# Patient Record
Sex: MALE | Race: OTHER | Employment: FULL TIME | URBAN - METROPOLITAN AREA
[De-identification: names, ages, dates, MRNs, and addresses within clinical notes are randomized per-mention and may not be internally consistent; named-entity substitution may affect disease eponyms.]

---

## 2018-02-19 ENCOUNTER — APPOINTMENT (OUTPATIENT)
Dept: GENERAL RADIOLOGY | Age: 63
End: 2018-02-19
Attending: INTERNAL MEDICINE
Payer: COMMERCIAL

## 2018-02-19 ENCOUNTER — APPOINTMENT (OUTPATIENT)
Dept: CT IMAGING | Age: 63
End: 2018-02-19
Attending: INTERNAL MEDICINE
Payer: COMMERCIAL

## 2018-02-19 ENCOUNTER — HOSPITAL ENCOUNTER (EMERGENCY)
Age: 63
Discharge: HOME | End: 2018-02-19
Attending: INTERNAL MEDICINE
Payer: COMMERCIAL

## 2018-02-19 VITALS
TEMPERATURE: 98 F | SYSTOLIC BLOOD PRESSURE: 138 MMHG | HEART RATE: 70 BPM | WEIGHT: 170 LBS | DIASTOLIC BLOOD PRESSURE: 70 MMHG | OXYGEN SATURATION: 100 % | RESPIRATION RATE: 16 BRPM

## 2018-02-19 DIAGNOSIS — E87.6 HYPOKALEMIA: ICD-10-CM

## 2018-02-19 DIAGNOSIS — R42 DIZZINESS: Primary | ICD-10-CM

## 2018-02-19 DIAGNOSIS — E83.51 HYPOCALCEMIA: ICD-10-CM

## 2018-02-19 LAB
ALBUMIN SERPL-MCNC: 3 G/DL (ref 3.4–5)
ALBUMIN/GLOB SERPL: 1 {RATIO} (ref 0.8–1.7)
ALP SERPL-CCNC: 62 U/L (ref 45–117)
ALT SERPL-CCNC: 22 U/L (ref 16–61)
ANION GAP SERPL CALC-SCNC: 7 MMOL/L (ref 3–18)
APPEARANCE UR: CLEAR
AST SERPL-CCNC: 17 U/L (ref 15–37)
ATRIAL RATE: 66 BPM
BASOPHILS # BLD: 0 K/UL (ref 0–0.06)
BASOPHILS NFR BLD: 0 % (ref 0–2)
BILIRUB SERPL-MCNC: 0.7 MG/DL (ref 0.2–1)
BILIRUB UR QL: NEGATIVE
BUN SERPL-MCNC: 15 MG/DL (ref 7–18)
BUN/CREAT SERPL: 15 (ref 12–20)
CALCIUM SERPL-MCNC: 8 MG/DL (ref 8.5–10.1)
CALCULATED P AXIS, ECG09: 3 DEGREES
CALCULATED R AXIS, ECG10: 56 DEGREES
CALCULATED T AXIS, ECG11: 67 DEGREES
CHLORIDE SERPL-SCNC: 108 MMOL/L (ref 100–108)
CK MB CFR SERPL CALC: 2 % (ref 0–4)
CK MB CFR SERPL CALC: NORMAL % (ref 0–4)
CK MB SERPL-MCNC: 1.5 NG/ML (ref 5–25)
CK MB SERPL-MCNC: <1 NG/ML (ref 5–25)
CK SERPL-CCNC: 60 U/L (ref 39–308)
CK SERPL-CCNC: 74 U/L (ref 39–308)
CO2 SERPL-SCNC: 27 MMOL/L (ref 21–32)
COLOR UR: YELLOW
CREAT SERPL-MCNC: 1.02 MG/DL (ref 0.6–1.3)
DIAGNOSIS, 93000: NORMAL
DIFFERENTIAL METHOD BLD: ABNORMAL
EOSINOPHIL # BLD: 0.2 K/UL (ref 0–0.4)
EOSINOPHIL NFR BLD: 3 % (ref 0–5)
ERYTHROCYTE [DISTWIDTH] IN BLOOD BY AUTOMATED COUNT: 13.5 % (ref 11.6–14.5)
GLOBULIN SER CALC-MCNC: 3 G/DL (ref 2–4)
GLUCOSE SERPL-MCNC: 99 MG/DL (ref 74–99)
GLUCOSE UR STRIP.AUTO-MCNC: NEGATIVE MG/DL
HCT VFR BLD AUTO: 40.2 % (ref 36–48)
HGB BLD-MCNC: 13.6 G/DL (ref 13–16)
HGB UR QL STRIP: NEGATIVE
KETONES UR QL STRIP.AUTO: NEGATIVE MG/DL
LEUKOCYTE ESTERASE UR QL STRIP.AUTO: NEGATIVE
LYMPHOCYTES # BLD: 2.2 K/UL (ref 0.9–3.6)
LYMPHOCYTES NFR BLD: 28 % (ref 21–52)
MAGNESIUM SERPL-MCNC: 1.8 MG/DL (ref 1.6–2.6)
MCH RBC QN AUTO: 28.9 PG (ref 24–34)
MCHC RBC AUTO-ENTMCNC: 33.8 G/DL (ref 31–37)
MCV RBC AUTO: 85.5 FL (ref 74–97)
MONOCYTES # BLD: 0.9 K/UL (ref 0.05–1.2)
MONOCYTES NFR BLD: 11 % (ref 3–10)
NEUTS SEG # BLD: 4.5 K/UL (ref 1.8–8)
NEUTS SEG NFR BLD: 58 % (ref 40–73)
NITRITE UR QL STRIP.AUTO: NEGATIVE
P-R INTERVAL, ECG05: 142 MS
PH UR STRIP: 7.5 [PH] (ref 5–8)
PLATELET # BLD AUTO: 188 K/UL (ref 135–420)
PMV BLD AUTO: 10.2 FL (ref 9.2–11.8)
POTASSIUM SERPL-SCNC: 3.3 MMOL/L (ref 3.5–5.5)
PROT SERPL-MCNC: 6 G/DL (ref 6.4–8.2)
PROT UR STRIP-MCNC: NEGATIVE MG/DL
Q-T INTERVAL, ECG07: 420 MS
QRS DURATION, ECG06: 92 MS
QTC CALCULATION (BEZET), ECG08: 440 MS
RBC # BLD AUTO: 4.7 M/UL (ref 4.7–5.5)
SODIUM SERPL-SCNC: 142 MMOL/L (ref 136–145)
SP GR UR REFRACTOMETRY: 1.01 (ref 1–1.03)
TROPONIN I SERPL-MCNC: <0.02 NG/ML (ref 0–0.06)
TROPONIN I SERPL-MCNC: <0.02 NG/ML (ref 0–0.06)
UROBILINOGEN UR QL STRIP.AUTO: 0.2 EU/DL (ref 0.2–1)
VENTRICULAR RATE, ECG03: 66 BPM
WBC # BLD AUTO: 7.9 K/UL (ref 4.6–13.2)

## 2018-02-19 PROCEDURE — 81003 URINALYSIS AUTO W/O SCOPE: CPT | Performed by: INTERNAL MEDICINE

## 2018-02-19 PROCEDURE — 74011250636 HC RX REV CODE- 250/636: Performed by: INTERNAL MEDICINE

## 2018-02-19 PROCEDURE — 83735 ASSAY OF MAGNESIUM: CPT | Performed by: INTERNAL MEDICINE

## 2018-02-19 PROCEDURE — 80053 COMPREHEN METABOLIC PANEL: CPT | Performed by: INTERNAL MEDICINE

## 2018-02-19 PROCEDURE — 85025 COMPLETE CBC W/AUTO DIFF WBC: CPT | Performed by: INTERNAL MEDICINE

## 2018-02-19 PROCEDURE — 96360 HYDRATION IV INFUSION INIT: CPT

## 2018-02-19 PROCEDURE — 71045 X-RAY EXAM CHEST 1 VIEW: CPT

## 2018-02-19 PROCEDURE — 99285 EMERGENCY DEPT VISIT HI MDM: CPT

## 2018-02-19 PROCEDURE — 93005 ELECTROCARDIOGRAM TRACING: CPT

## 2018-02-19 PROCEDURE — 70450 CT HEAD/BRAIN W/O DYE: CPT

## 2018-02-19 PROCEDURE — 96361 HYDRATE IV INFUSION ADD-ON: CPT

## 2018-02-19 PROCEDURE — 74011250637 HC RX REV CODE- 250/637: Performed by: INTERNAL MEDICINE

## 2018-02-19 PROCEDURE — 82553 CREATINE MB FRACTION: CPT | Performed by: INTERNAL MEDICINE

## 2018-02-19 RX ORDER — CALCIUM CARBONATE 200(500)MG
500 TABLET,CHEWABLE ORAL
Status: COMPLETED | OUTPATIENT
Start: 2018-02-19 | End: 2018-02-19

## 2018-02-19 RX ORDER — POTASSIUM CHLORIDE 1.5 G/1.77G
40 POWDER, FOR SOLUTION ORAL
Status: COMPLETED | OUTPATIENT
Start: 2018-02-19 | End: 2018-02-19

## 2018-02-19 RX ADMIN — ANTACID TABLETS 500 MG: 500 TABLET, CHEWABLE ORAL at 09:32

## 2018-02-19 RX ADMIN — SODIUM CHLORIDE 1000 ML: 900 INJECTION, SOLUTION INTRAVENOUS at 08:16

## 2018-02-19 RX ADMIN — POTASSIUM CHLORIDE 40 MEQ: 1.5 POWDER, FOR SOLUTION ORAL at 09:32

## 2018-02-19 NOTE — ED TRIAGE NOTES
Pt brought in by ems for dizziness. Pt states \" I was in the shower taking a hot bth and I didn't urn the vent on and I think I overheated. \" I feel much better now. \"

## 2018-02-19 NOTE — ED NOTES
Pt tolerated orthostatic VS without difficulty, denies dizziness. Family at bedside. Pt in NAD, will continue to monitor. Call light within reach. No needs expressed at this time. Urinal within reach.

## 2018-02-19 NOTE — DISCHARGE INSTRUCTIONS
Hipocalcemia: Instrucciones de cuidado - [ Hypocalcemia: Care Instructions ]  Instrucciones de cuidado    La hipocalcemia consiste en tener el nivel de calcio en la delfino más bajo de lo que debería. Godwin médico girma vez le haya hecho pruebas para revisar lauren niveles de calcio porque usted tenía ciertos síntomas. Estos incluyen hormigueo o espasmos en los músculos. Godwin médico puede hacerle más pruebas para averiguar por qué está bajo godwin calcio y para victorino lo xiang que le están funcionando los riñones y Carla Roya. Godwin médico también querrá victorino lo xiang que le está funcionando la glándula paratiroidea. Esta glándula controla los niveles de calcio en la Indu. Usted puede tener truong problema porque no está obteniendo suficientemente calcio en godwin alimentación. O es posible que godwin cuerpo no esté absorbiendo el calcio arnav debería. Usted puede hacer que godwin calcio llegue a un nivel seguro tomando suplementos. Si lauren niveles son [place] oak, godwin médico podría inyectarle calcio, girma vez junto con magnesio. Probablemente también le den vitamina D, porque la necesita para absorber el calcio. Después de que godwin médico logre elevarle el nivel de calcio, asegúrese de obtener abundante calcio en godwin alimentación. Si tiene un problema renal o paratiroideo, es posible que tenga que seguir tomando calcio adicional.  La atención de seguimiento es ligia parte clave de godwin tratamiento y seguridad. Asegúrese de hacer y acudir a todas las citas, y llame a godwin médico si está teniendo problemas. También es ligia buena idea saber los resultados de los exámenes y mantener ligia lista de los medicamentos que amairani. ¿Cómo puede cuidarse en el hogar? · [place] los medicamentos exactamente según las indicaciones. Llame a godwin médico si deisy estar teniendo un problema con godwin medicamento. · Consuma alimentos ricos en calcio. Estos incluyen yogur, Zaid Lent y verduras de hojas de color myla oscuro.  Esta es la mejor manera de obtener la cantidad de calcio que necesita. También puede obtener la vitamina D de los [place], los pescados grasos, los cereales y [place]. · Hable con godwin médico sobre yudi un suplemento de calcio más vitamina D.  · Manténgase activo. Hacer regularmente ejercicios en los que se soporta el propio peso corporal, arnav caminar, puede ayudarle a [employer] ricarda. También puede mejorar godwin estado general de josé antonio. · Póngase al sol sin protector solar por ligia pequeña cantidad de tiempo. El sol ayuda al cuerpo a elaborar la vitamina D. Hable mariano con godwin médico si ha tenido cáncer de piel o si tiene alto riesgo de tener cáncer de piel. ¿Cuándo debe pedir ayuda? Llame a godwin médico ahora mismo o busque atención médica inmediata si:  ? · Siente hormigueo o entumecimiento en los dedos y las rebekah o los dedos del pie y los pies. ? · Está confuso o tiene problemas para recordar cosas. ? · Tiene espasmos o calambres musculares. ? · El corazón parece acelerarse y luego desacelerarse o se salta latidos. ? · Se siente melissa o deprimido, o no disfruta de las cosas arnav solía [facility] [facility]. Podría estar deprimido, lo que es común en personas con hipocalcemia. La depresión se puede tratar. ?Preste especial atención a los cambios en godwin josé antonio y asegúrese de comunicarse con godwin médico si:  ? · No mejora arnav se esperaba. ¿Dónde puede encontrar más información en inglés? Wen Taylor a [email or web address] Escriba P520 en la búsqueda para aprender más acerca de \"Hipocalcemia: Instrucciones de cuidado - [ Hypocalcemia: Care Instructions ]. \"  Revisado: 12 vyas, 2017  Versión del contenido: 11.4  © 4686-0743 Healthwise, [employer]. Las instrucciones de cuidado fueron adaptadas bajo licencia por [employer] (which disclaims liability or warranty for this information). Si usted tiene [place] afección médica o sobre estas instrucciones, siempre pregunte a godwin profesional de josé antonio.  [employer] niega toda garantía o responsabilidad por godwin uso de esta información. Mareos: Instrucciones de cuidado - [ Dizziness: Care Instructions ]  Instrucciones de cuidado  Los mareos son [place] sensación de inestabilidad o confusión en la gunner. Son distintos al vértigo, ligia sensación de que la habitación gira o de que usted se mueve o . También es distinto del aturdimiento, que es la sensación de que está a punto de desmayarse. Puede resultar difícil conocer la causa de los [place]. Algunas personas se sienten mareadas cuando tienen migrañas. A veces, los episodios gripales pueden hacer que se sienta mareado. Algunas afecciones médicas, arnav los problemas cardíacos o la presión arterial zena, pueden hacer que se sienta mareado. Muchos medicamentos pueden causar mareos, arnav los que se usan para la presión arterial zena, el dolor o la ansiedad. Si es un medicamento el que está causando los síntomas, godwin médico podría recomendarle que lo cambie o deje de tomarlo. Si es un problema cardíaco, podría necesitar medicamentos para ayudar a que godwin corazón funcione mejor. Si no hay razón aparente para los síntomas, godwin médico podría sugerir vigilar y esperar sidney un tiempo para victorino si los mareos desaparecen por sí solos. La atención de seguimiento es ligia parte clave de godwin tratamiento y seguridad. Asegúrese de hacer y acudir a todas las citas, y llame a godwin médico si está teniendo problemas. También es ligia buena idea saber los resultados de los exámenes y mantener ligia lista de los medicamentos que amairani. ¿Cómo puede cuidarse en el hogar? · Si godwin médico le recomienda o receta medicamentos, tómelos exactamente según las indicaciones. Llame a godwin médico si deisy estar teniendo un problema con godwin medicamento. · No conduzca mientras se sienta mareado. · Trate de prevenir las caídas. Algunas medidas que puede yudi son:  Buster Shed Usar tapetes antideslizantes, agregar agarraderas cerca de la minna y usar luces nocturnas.   ¨ Ordenar godwin casa de girma manera que en los senderos no haya nada con lo que se pueda tropezar. ¨ Avisarles a familiares y [facility] que se ha estado sintiendo [place]. [place] les servirá para saber cómo ayudarle. ¿Cuándo debe pedir ayuda? Llame al 911 en cualquier momento que considere que necesita atención de [place]. Por ejemplo, llame si:  ? · Se desmayó (perdió el conocimiento). ? · Tiene mareos junto con síntomas de un ataque cardíaco. Estos pueden incluir:  ¨ Dolor de pecho, o presión o ligia sensación extraña en el pecho. ¨ Sudoración. ¨ Falta de aire. ¨ Náuseas o vómito. ¨ Dolor, presión, o ligia sensación extraña en la espalda, el john, la mandíbula o el abdomen superior, o en lacey o ambos hombros o brazos. ¨ Aturdimiento o debilidad repentina. ¨ Un latido cardíaco rápido o irregular. ? · Tiene síntomas de un ataque cerebral. Estos pueden incluir:  ¨ Entumecimiento, hormigueo, debilidad o parálisis repentinos en la nayeli, el brazo o la pierna, sobre todo si ocurre en un solo lado del cuerpo. ¨ Cambios súbitos en la vista. ¨ Problemas repentinos para hablar. ¨ Confusión súbita o dificultad repentina para comprender frases sencillas. ¨ Problemas repentinos para caminar o mantener el equilibrio. ¨ Un dolor de gunner intenso y repentino, distinto a los jose de gunner anteriores. ?Llame a godwin médico ahora mismo o busque atención médica inmediata si:  ? · Se siente mareado y tiene [place], bhargavi [place] o zumbido [employer] oídos. ? · Tiene nuevas náuseas y vómito o [address] ? · Lyric mareos no desaparecen o regresan. ?Preste especial atención a los cambios en godwin josé antonio y asegúrese de comunicarse con godwin médico si:  ? · No mejora arnav se esperaba. ¿Dónde puede encontrar más información en inglés? Lenora Pulido a [email or web address] [place] V673 en la búsqueda para aprender más acerca de \"Mareos: Instrucciones de cuidado - [ Dizziness: Care Instructions ]. \"  Revisado: 20 marzo, 2017  Versión del contenido: 11.4  © 8266-2586 Healthwise, [employer]. Las instrucciones de cuidado fueron adaptadas bajo licencia por [employer] (which disclaims liability or warranty for this information). Si usted tiene [place] afección médica o sobre estas instrucciones, siempre pregunte a godwin profesional de josé antonio. Healthwise, Incorporated niega toda garantía o responsabilidad por godwin uso de esta información. Hipocalemia: Instrucciones de cuidado - [ Hypokalemia: Care Instructions ]  Instrucciones de cuidado    La hipocalemia es un nivel bajo de potasio. El corazón, los músculos, los riñones y el sistema nervioso necesitan potasio para funcionar correctamente. Maria Luisa problema tiene muchas [employer]. Los problemas renales, la alimentación y los medicamentos arnav diuréticos y laxantes pueden causarla. [address] En algunos casos, el cáncer es la causa. Godwin médico puede hacerle pruebas para determinar la causa de lauren niveles bajos de [place]. Es posible que necesite medicamentos para hacer que lauren niveles de potasio vuelvan a la normalidad. También podría tener que hacerse análisis de delfino con regularidad para revisar el potasio. Si tiene un nivel de potasio muy bajo, podría necesitar medicamentos por vía intravenosa (IV). También podría necesitar pruebas para revisar la actividad eléctrica de godwin corazón. Los problemas del corazón causados por los bajos niveles de potasio pueden ser [employer]. La atención de seguimiento es ligia parte clave de godwin tratamiento y seguridad. Asegúrese de hacer y acudir a todas las citas, y llame a godwin médico si está teniendo problemas. También es ligia buena idea saber los resultados de los exámenes y mantener ligia lista de los medicamentos que amairani. ¿Cómo puede cuidarse en el hogar? · Si el médico lo recomienda, consuma alimentos que tengan [employer]. Estos incluyen frutas frescas, jugos y verduras.  Patti Pimentel [address] [place] [place], los frijoles (habichuelas) y [address] · Sea malcolm con los medicamentos. Si godwin médico le receta medicamentos o suplementos de potasio, tómelos según las indicaciones. Llame a godwin médico si tiene algún problema con los medicamentos. · Hágase análisis para revisar lauren niveles de potasio con la frecuencia que le indique el médico.  ¿Cuándo debe pedir ayuda? Llame al 911 en cualquier momento que considere que necesita atención de [place]. Por ejemplo, llame si:  ? · Siente que godwin corazón omite latidos. Los problemas del corazón causados por los bajos niveles de potasio pueden provocar la [place]. ? · Se desmayó (perdió el conocimiento). ? · Tiene un episodio de convulsiones. ?Llame a godwin médico ahora mismo o busque atención médica inmediata si:  ? · Se siente débil o inusualmente cansado. ? · Tiene ricarda calambres en los brazos o las piernas. ? · Tiene hormigueo o entumecimiento. ? · Siente el estómago revuelto, o vomita. ? · Tiene retortijones abdominales. ? · Está abotagado o estreñido. ? · Necesita orinar con mucha frecuencia. ? · Tiene mucha sed la mayor parte del [place]. ? · [employer] o aturdimiento, o que está a punto de [place]. ? · Se siente deprimido o pierde el contacto con la realidad. ?Preste especial atención a los cambios en godwin josé antonio y asegúrese de comunicarse con godwin médico si:  ? · No mejora arnav se esperaba. ¿Dónde puede encontrar más información en [facility]? Xavier Pope a [email or web address].[email or web address] [facility] I087 en la búsqueda para aprender más acerca de \"Hipocalemia: Instrucciones de cuidado - [ Hypokalemia: Care Instructions ]. \"  Revisado: 12 mayo, 2017  Versión del contenido: 11.4  © 2385-2458 Healthwise, [employer]. Las instrucciones de cuidado fueron adaptadas bajo licencia por [employer] (which disclaims liability or warranty for this information).  Si usted tiene [place] afección médica o sobre estas instrucciones, siempre pregunte a godwin profesional de josé antonio. [facility] [employer] niega toda garantía o responsabilidad por godwin uso de esta información.

## 2018-02-19 NOTE — ED PROVIDER NOTES
EMERGENCY DEPARTMENT HISTORY AND PHYSICAL EXAM    Date: 2/19/2018  Patient Name: Ethel Diaz    History of Presenting Illness     Chief Complaint   Patient presents with    Dizziness         History Provided By: Patient    Chief Complaint: dizziness  Duration: PTA   Timing:  Acute  Location: generalized  Severity: 0 out of 10  Associated Symptoms: syncope    Additional History (Context):   7:22 AM  Ethel Diaz is a 57 y.o. male with PMHX of HTN (compliant with medication) who presents to the emergency department via EMS C/O acute dizziness where pt had a momentary syncopal episode while taking a hot shower onset PTA. Pt attributes this to water being too hot and being hydrated and states his blood pressure went down, as pt is improved now. Pt states he felt fine before the episode occurred. Pt occasionally takes ASA 81 mg. Pt is a former smoker and a social EtOH user. PSHx includes bowel obstruction surgery. NKDA. Pt denies nausea, vomiting, chest pain, SOB, bleeding, swelling, rash, fever, head injury, dizziness that occurred after syncopal episode, abd pain, and any other sxs or complaints. PCP: Carlton Valadez MD    Current Outpatient Prescriptions   Medication Sig Dispense Refill    AMLODIPINE BESYLATE (AMLODIPINE PO) Take  by mouth. Past History     Past Medical History:  Past Medical History:   Diagnosis Date    Bowel obstruction     Hypertension        Past Surgical History:  Past Surgical History:   Procedure Laterality Date    HX GI      bowel obstruction       Family History:  History reviewed. No pertinent family history. Social History:  Social History   Substance Use Topics    Smoking status: Former Smoker    Smokeless tobacco: Never Used    Alcohol use Yes      Comment: socially       Allergies: Allergies no known allergies      Review of Systems   Review of Systems   Constitutional: Negative for fever.    HENT:        (-) Head injury   Respiratory: Negative for shortness of breath. Cardiovascular: Negative for chest pain. Gastrointestinal: Negative for abdominal pain, nausea and vomiting. Skin: Negative for rash. (-) Bleeding   Neurological: Positive for dizziness and syncope. All other systems reviewed and are negative. Physical Exam     Vitals:    02/19/18 0704 02/19/18 0730 02/19/18 0800 02/19/18 0900   BP: 130/70  119/70 141/72   Pulse: 72 79 68 75   Resp: 15 17 15 16   Temp: 97.6 °F (36.4 °C)      SpO2: 97% 97% 97% 100%   Weight: 77.1 kg (170 lb)        Physical Exam   Constitutional: He is oriented to person, place, and time. He appears well-developed and well-nourished. HENT:   Head: Normocephalic and atraumatic. Right Ear: External ear normal.   Left Ear: External ear normal.   Nose: Nose normal.   Mouth/Throat: Mucous membranes are dry. He has dentures. Posterior oropharyngeal erythema present. Erythema in posterior oropharynx. Posterior cervical lymphadenopathy. Mouth has erythema in bilateral middle turbinates. No sinus TTP. Eyes: Conjunctivae and EOM are normal. Pupils are equal, round, and reactive to light. Neck: Normal range of motion. Neck supple. No JVD present. No tracheal deviation present. No thyromegaly present. Cardiovascular: Normal rate, regular rhythm, normal heart sounds and intact distal pulses. Pulmonary/Chest: Effort normal and breath sounds normal.   Abdominal: Soft. Bowel sounds are normal. He exhibits no distension and no mass. There is no tenderness. No HSM   Musculoskeletal: Normal range of motion. He exhibits no edema or tenderness. Lymphadenopathy:     He has no cervical adenopathy. Neurological: He is alert and oriented to person, place, and time. He has normal reflexes. No cranial nerve deficit. He exhibits normal muscle tone. Coordination normal.   No focal weakness. No pronator drift. Skin: Skin is warm and dry. Superficial abrasion above the left eyebrow. No active bleeding.    Psychiatric: He has a normal mood and affect. His behavior is normal. Thought content normal.   Nursing note and vitals reviewed. Diagnostic Study Results     Labs -     Recent Results (from the past 12 hour(s))   CBC WITH AUTOMATED DIFF    Collection Time: 02/19/18  7:05 AM   Result Value Ref Range    WBC 7.9 4.6 - 13.2 K/uL    RBC 4.70 4.70 - 5.50 M/uL    HGB 13.6 13.0 - 16.0 g/dL    HCT 40.2 36.0 - 48.0 %    MCV 85.5 74.0 - 97.0 FL    MCH 28.9 24.0 - 34.0 PG    MCHC 33.8 31.0 - 37.0 g/dL    RDW 13.5 11.6 - 14.5 %    PLATELET 683 992 - 390 K/uL    MPV 10.2 9.2 - 11.8 FL    NEUTROPHILS 58 40 - 73 %    LYMPHOCYTES 28 21 - 52 %    MONOCYTES 11 (H) 3 - 10 %    EOSINOPHILS 3 0 - 5 %    BASOPHILS 0 0 - 2 %    ABS. NEUTROPHILS 4.5 1.8 - 8.0 K/UL    ABS. LYMPHOCYTES 2.2 0.9 - 3.6 K/UL    ABS. MONOCYTES 0.9 0.05 - 1.2 K/UL    ABS. EOSINOPHILS 0.2 0.0 - 0.4 K/UL    ABS. BASOPHILS 0.0 0.0 - 0.06 K/UL    DF AUTOMATED     METABOLIC PANEL, COMPREHENSIVE    Collection Time: 02/19/18  7:05 AM   Result Value Ref Range    Sodium 142 136 - 145 mmol/L    Potassium 3.3 (L) 3.5 - 5.5 mmol/L    Chloride 108 100 - 108 mmol/L    CO2 27 21 - 32 mmol/L    Anion gap 7 3.0 - 18 mmol/L    Glucose 99 74 - 99 mg/dL    BUN 15 7.0 - 18 MG/DL    Creatinine 1.02 0.6 - 1.3 MG/DL    BUN/Creatinine ratio 15 12 - 20      GFR est AA >60 >60 ml/min/1.73m2    GFR est non-AA >60 >60 ml/min/1.73m2    Calcium 8.0 (L) 8.5 - 10.1 MG/DL    Bilirubin, total 0.7 0.2 - 1.0 MG/DL    ALT (SGPT) 22 16 - 61 U/L    AST (SGOT) 17 15 - 37 U/L    Alk.  phosphatase 62 45 - 117 U/L    Protein, total 6.0 (L) 6.4 - 8.2 g/dL    Albumin 3.0 (L) 3.4 - 5.0 g/dL    Globulin 3.0 2.0 - 4.0 g/dL    A-G Ratio 1.0 0.8 - 1.7     MAGNESIUM    Collection Time: 02/19/18  7:05 AM   Result Value Ref Range    Magnesium 1.8 1.6 - 2.6 mg/dL   CARDIAC PANEL,(CK, CKMB & TROPONIN)    Collection Time: 02/19/18  7:05 AM   Result Value Ref Range    CK 60 39 - 308 U/L    CK - MB <1.0 <3.6 ng/ml    CK-MB Index  0.0 - 4.0 %     CALCULATION NOT PERFORMED WHEN RESULT IS BELOW LINEAR LIMIT    Troponin-I, Qt. <0.02 0.00 - 0.06 NG/ML   EKG, 12 LEAD, INITIAL    Collection Time: 02/19/18  8:18 AM   Result Value Ref Range    Ventricular Rate 66 BPM    Atrial Rate 66 BPM    P-R Interval 142 ms    QRS Duration 92 ms    Q-T Interval 420 ms    QTC Calculation (Bezet) 440 ms    Calculated P Axis 3 degrees    Calculated R Axis 56 degrees    Calculated T Axis 67 degrees    Diagnosis       Normal sinus rhythm  Normal ECG  No previous ECGs available     URINALYSIS W/ RFLX MICROSCOPIC    Collection Time: 02/19/18  9:33 AM   Result Value Ref Range    Color YELLOW      Appearance CLEAR      Specific gravity 1.009 1.005 - 1.030      pH (UA) 7.5 5.0 - 8.0      Protein NEGATIVE  NEG mg/dL    Glucose NEGATIVE  NEG mg/dL    Ketone NEGATIVE  NEG mg/dL    Bilirubin NEGATIVE  NEG      Blood NEGATIVE  NEG      Urobilinogen 0.2 0.2 - 1.0 EU/dL    Nitrites NEGATIVE  NEG      Leukocyte Esterase NEGATIVE  NEG     CARDIAC PANEL,(CK, CKMB & TROPONIN)    Collection Time: 02/19/18 10:16 AM   Result Value Ref Range    CK 74 39 - 308 U/L    CK - MB 1.5 <3.6 ng/ml    CK-MB Index 2.0 0.0 - 4.0 %    Troponin-I, Qt. <0.02 0.00 - 0.06 NG/ML       Radiologic Studies -   CT HEAD WO CONT   Final Result   IMPRESSION:        1. No acute intracranial abnormality. XR CHEST PORT   Final Result   Impression:  1 underexpanded lungs without superimposed acute radiographic abnormality. As read by the radiologist.     CT Results  (Last 48 hours)               02/19/18 1051  CT HEAD WO CONT Final result    Impression:  IMPRESSION:           1. No acute intracranial abnormality. Narrative:  EXAM: CT head       INDICATION: Headache, weakness, dizziness, loss of consciousness. COMPARISON: None.        TECHNIQUE: Axial CT imaging of the head was performed without intravenous   contrast.       One or more dose reduction techniques were used on this CT: automated exposure control, adjustment of the mAs and/or kVp according to patient's size, and   iterative reconstruction techniques. The specific techniques utilized on this CT   exam have been documented in the patient's electronic medical record.   _______________       FINDINGS:       BRAIN AND POSTERIOR FOSSA: The sulci, folia, ventricles and basal cisterns are   within normal limits for the patient?s age. Physiologic basal ganglia   calcifications are demonstrated bilaterally. There is no intracranial   hemorrhage, mass effect, or midline shift. The gray-white matter differentiation   is within normal limits. EXTRA-AXIAL SPACES AND MENINGES: There are no abnormal extra-axial fluid   collections. CALVARIUM: No acute osseous abnormality. SINUSES: Imaged paranasal sinuses and mastoid air cells are clear. OTHER: Faint atherosclerotic calcifications are noted bilaterally. _______________               CXR Results  (Last 48 hours)               02/19/18 0810  XR CHEST PORT Final result    Impression:  Impression:   1 underexpanded lungs without superimposed acute radiographic abnormality. Narrative:  Chest, single view       Indication: Weakness and dizziness       Comparison: None       Findings:  Portable upright AP view of the chest was obtained. The lungs are   mildly underexpanded but clear. There is no focal pneumonic consolidation. No   pneumothorax or pleural effusion. Cardiac size and mediastinal contours are   within normal limits. No acute osseous abnormality, with mild degenerative   change of each shoulder girdle demonstrated.                  Medications given in the ED-  Medications   sodium chloride 0.9 % bolus infusion 1,000 mL (1,000 mL IntraVENous New Bag 2/19/18 0816)   potassium chloride (KLOR-CON) packet 40 mEq (40 mEq Oral Given 2/19/18 0932)   calcium carbonate (TUMS) chewable tablet 500 mg [elemental] (500 mg Oral Given 2/19/18 0932)         Medical Decision Making   I am the first provider for this patient. I reviewed the vital signs, available nursing notes, past medical history, past surgical history, family history and social history. Vital Signs-Reviewed the patient's vital signs. Pulse Oximetry Analysis - 97% on room air. Cardiac Monitor:  Rate: 99 bpm  Rhythm: sinus rhythm  Blood pressure reading at 137/77. EKG interpretation: (Preliminary)  8:18 AM   NSR at 66 bpm.Negative STEMI. EKG read by Cora Dawn MD at 8:21 AM     Records Reviewed: Nursing Notes    Provider Notes (Medical Decision Making): DDx: heat effect, dehydration, electrolyte abnormality, Arrhythmia, ACS, MI, doubt acute intracranial process, r/o anemia or infection. Procedures:  Procedures    ED Course:   7:22 AM Initial assessment performed. The patients presenting problems have been discussed, and they are in agreement with the care plan formulated and outlined with them. I have encouraged them to ask questions as they arise throughout their visit. 10:04 AM Pt daughter arrived, giving additional information. Pt daughter states that pt had a syncopal episode. Pt daughter states she performed CPR on him but he woke up, within 1 minute, said he is fine. 1100 am  Repeat cardiac enzymes wnl. Ct head no acute findings. 11:09 AM Pt ambulated without issue, nl gait, no sxs. Diagnosis and Disposition       DISCHARGE NOTE:  11:11 AM  Catracho Pierson  results have been reviewed with him. He has been counseled regarding his diagnosis, treatment, and plan. He verbally conveys understanding and agreement of the signs, symptoms, diagnosis, treatment and prognosis and additionally agrees to follow up as discussed. He also agrees with the care-plan and conveys that all of his questions have been answered.   I have also provided discharge instructions for him that include: educational information regarding their diagnosis and treatment, and list of reasons why they would want to return to the ED prior to their follow-up appointment, should his condition change. He has been provided with education for proper emergency department utilization. CLINICAL IMPRESSION:    1. Dizziness    2. Hypokalemia    3. Hypocalcemia        PLAN:  1. D/C Home  2. Current Discharge Medication List        3. Follow-up Information     Follow up With Details Comments Contact Info    Denita French MD Schedule an appointment as soon as possible for a visit For Primary Care Follow Up [address] [place] [address]      THE [facility] EMERGENCY DEPT Go to If symptoms worsen, As needed [address] [place] [id number]  [phone]        _______________________________    Attestations: This note is prepared by Bethel Loco, acting as Scribe for Uriel Mina MD.    Uriel Mina MD:  The scribe's documentation has been prepared under my direction and personally reviewed by me in its entirety.   I confirm that the note above accurately reflects all work, treatment, procedures, and medical decision making performed by me.  _______________________________

## 2018-02-19 NOTE — LETTER
[facility] [place] 
THE [facility] EMERGENCY DEPT 
[place] [id number] 
[phone] Work/School Note Date: 2/19/2018 To Whom It May concern: 
 
Amy Hagan was seen and treated today in the emergency room by the following provider(s): 
Attending Provider: Thom Hawkins MD.   
 
Amy Hagan may return to work on 02/20/2018. Sincerely, Racheal Smith RN